# Patient Record
Sex: FEMALE | Race: WHITE | URBAN - METROPOLITAN AREA
[De-identification: names, ages, dates, MRNs, and addresses within clinical notes are randomized per-mention and may not be internally consistent; named-entity substitution may affect disease eponyms.]

---

## 2018-04-11 ENCOUNTER — HOSPITAL ENCOUNTER (OUTPATIENT)
Facility: CLINIC | Age: 48
Setting detail: OBSERVATION
Discharge: HOME | End: 2018-04-12
Attending: EMERGENCY MEDICINE | Admitting: EMERGENCY MEDICINE
Payer: COMMERCIAL

## 2018-04-11 DIAGNOSIS — T78.40XA ALLERGIC REACTION, INITIAL ENCOUNTER: Primary | ICD-10-CM

## 2018-04-11 DIAGNOSIS — T78.2XXA ANAPHYLAXIS, INITIAL ENCOUNTER: ICD-10-CM

## 2018-04-11 LAB
ANION GAP SERPL CALCULATED.3IONS-SCNC: 12 MMOL/L (ref 3–14)
BASOPHILS # BLD AUTO: 0 10E9/L (ref 0–0.2)
BASOPHILS NFR BLD AUTO: 0.3 %
BUN SERPL-MCNC: 17 MG/DL (ref 7–30)
CALCIUM SERPL-MCNC: 8.4 MG/DL (ref 8.5–10.1)
CHLORIDE SERPL-SCNC: 106 MMOL/L (ref 94–109)
CO2 SERPL-SCNC: 23 MMOL/L (ref 20–32)
CREAT SERPL-MCNC: 0.76 MG/DL (ref 0.52–1.04)
DIFFERENTIAL METHOD BLD: NORMAL
EOSINOPHIL # BLD AUTO: 0.2 10E9/L (ref 0–0.7)
EOSINOPHIL NFR BLD AUTO: 2.2 %
ERYTHROCYTE [DISTWIDTH] IN BLOOD BY AUTOMATED COUNT: 12.3 % (ref 10–15)
GFR SERPL CREATININE-BSD FRML MDRD: 82 ML/MIN/1.7M2
GLUCOSE SERPL-MCNC: 120 MG/DL (ref 70–99)
HCT VFR BLD AUTO: 45.9 % (ref 35–47)
HGB BLD-MCNC: 15.5 G/DL (ref 11.7–15.7)
IMM GRANULOCYTES # BLD: 0 10E9/L (ref 0–0.4)
IMM GRANULOCYTES NFR BLD: 0.3 %
LYMPHOCYTES # BLD AUTO: 3.9 10E9/L (ref 0.8–5.3)
LYMPHOCYTES NFR BLD AUTO: 52.3 %
MCH RBC QN AUTO: 32 PG (ref 26.5–33)
MCHC RBC AUTO-ENTMCNC: 33.8 G/DL (ref 31.5–36.5)
MCV RBC AUTO: 95 FL (ref 78–100)
MONOCYTES # BLD AUTO: 0.4 10E9/L (ref 0–1.3)
MONOCYTES NFR BLD AUTO: 4.9 %
NEUTROPHILS # BLD AUTO: 3 10E9/L (ref 1.6–8.3)
NEUTROPHILS NFR BLD AUTO: 40 %
NRBC # BLD AUTO: 0 10*3/UL
NRBC BLD AUTO-RTO: 0 /100
PLATELET # BLD AUTO: 251 10E9/L (ref 150–450)
POTASSIUM SERPL-SCNC: 3.7 MMOL/L (ref 3.4–5.3)
RBC # BLD AUTO: 4.85 10E12/L (ref 3.8–5.2)
SODIUM SERPL-SCNC: 140 MMOL/L (ref 133–144)
T4 FREE SERPL-MCNC: 0.89 NG/DL (ref 0.76–1.46)
TSH SERPL DL<=0.005 MIU/L-ACNC: 4.61 MU/L (ref 0.4–4)
WBC # BLD AUTO: 7.4 10E9/L (ref 4–11)

## 2018-04-11 PROCEDURE — 25000128 H RX IP 250 OP 636: Performed by: NURSE PRACTITIONER

## 2018-04-11 PROCEDURE — 84443 ASSAY THYROID STIM HORMONE: CPT | Performed by: EMERGENCY MEDICINE

## 2018-04-11 PROCEDURE — 96375 TX/PRO/DX INJ NEW DRUG ADDON: CPT

## 2018-04-11 PROCEDURE — 25000128 H RX IP 250 OP 636: Performed by: EMERGENCY MEDICINE

## 2018-04-11 PROCEDURE — 96374 THER/PROPH/DIAG INJ IV PUSH: CPT

## 2018-04-11 PROCEDURE — 96376 TX/PRO/DX INJ SAME DRUG ADON: CPT

## 2018-04-11 PROCEDURE — 84439 ASSAY OF FREE THYROXINE: CPT | Performed by: EMERGENCY MEDICINE

## 2018-04-11 PROCEDURE — 80048 BASIC METABOLIC PNL TOTAL CA: CPT | Performed by: EMERGENCY MEDICINE

## 2018-04-11 PROCEDURE — 99220 ZZC INITIAL OBSERVATION CARE,LEVL III: CPT | Mod: Z6 | Performed by: NURSE PRACTITIONER

## 2018-04-11 PROCEDURE — G0378 HOSPITAL OBSERVATION PER HR: HCPCS

## 2018-04-11 PROCEDURE — 99285 EMERGENCY DEPT VISIT HI MDM: CPT | Mod: 25

## 2018-04-11 PROCEDURE — 85025 COMPLETE CBC W/AUTO DIFF WBC: CPT | Performed by: EMERGENCY MEDICINE

## 2018-04-11 RX ORDER — ONDANSETRON 4 MG/1
4 TABLET, ORALLY DISINTEGRATING ORAL EVERY 6 HOURS PRN
Status: DISCONTINUED | OUTPATIENT
Start: 2018-04-11 | End: 2018-04-11

## 2018-04-11 RX ORDER — DIPHENHYDRAMINE HYDROCHLORIDE 50 MG/ML
25-50 INJECTION INTRAMUSCULAR; INTRAVENOUS EVERY 4 HOURS PRN
Status: DISCONTINUED | OUTPATIENT
Start: 2018-04-11 | End: 2018-04-12 | Stop reason: HOSPADM

## 2018-04-11 RX ORDER — ONDANSETRON 4 MG/1
4 TABLET, ORALLY DISINTEGRATING ORAL EVERY 6 HOURS PRN
Status: DISCONTINUED | OUTPATIENT
Start: 2018-04-11 | End: 2018-04-12 | Stop reason: HOSPADM

## 2018-04-11 RX ORDER — ALBUTEROL SULFATE 0.83 MG/ML
2.5 SOLUTION RESPIRATORY (INHALATION)
Status: DISCONTINUED | OUTPATIENT
Start: 2018-04-11 | End: 2018-04-12 | Stop reason: HOSPADM

## 2018-04-11 RX ORDER — METOCLOPRAMIDE HYDROCHLORIDE 5 MG/ML
10 INJECTION INTRAMUSCULAR; INTRAVENOUS EVERY 6 HOURS PRN
Status: DISCONTINUED | OUTPATIENT
Start: 2018-04-11 | End: 2018-04-12 | Stop reason: HOSPADM

## 2018-04-11 RX ORDER — EPINEPHRINE 0.3 MG/.3ML
0.3 INJECTION SUBCUTANEOUS
Status: DISCONTINUED | OUTPATIENT
Start: 2018-04-11 | End: 2018-04-11

## 2018-04-11 RX ORDER — DIPHENHYDRAMINE HYDROCHLORIDE 50 MG/ML
25 INJECTION INTRAMUSCULAR; INTRAVENOUS ONCE
Status: COMPLETED | OUTPATIENT
Start: 2018-04-11 | End: 2018-04-11

## 2018-04-11 RX ORDER — DIPHENHYDRAMINE HCL 25 MG
25 CAPSULE ORAL
Status: DISCONTINUED | OUTPATIENT
Start: 2018-04-12 | End: 2018-04-12 | Stop reason: HOSPADM

## 2018-04-11 RX ORDER — METOCLOPRAMIDE 5 MG/1
10 TABLET ORAL EVERY 6 HOURS PRN
Status: DISCONTINUED | OUTPATIENT
Start: 2018-04-11 | End: 2018-04-12 | Stop reason: HOSPADM

## 2018-04-11 RX ORDER — LIDOCAINE 40 MG/G
CREAM TOPICAL
Status: DISCONTINUED | OUTPATIENT
Start: 2018-04-11 | End: 2018-04-12 | Stop reason: HOSPADM

## 2018-04-11 RX ORDER — DIPHENHYDRAMINE HYDROCHLORIDE 50 MG/ML
25 INJECTION INTRAMUSCULAR; INTRAVENOUS EVERY 6 HOURS
Status: DISCONTINUED | OUTPATIENT
Start: 2018-04-11 | End: 2018-04-11

## 2018-04-11 RX ORDER — PROCHLORPERAZINE MALEATE 5 MG
10 TABLET ORAL EVERY 6 HOURS PRN
Status: DISCONTINUED | OUTPATIENT
Start: 2018-04-11 | End: 2018-04-12 | Stop reason: HOSPADM

## 2018-04-11 RX ORDER — ACETAMINOPHEN 650 MG/1
650 SUPPOSITORY RECTAL EVERY 4 HOURS PRN
Status: DISCONTINUED | OUTPATIENT
Start: 2018-04-11 | End: 2018-04-12 | Stop reason: HOSPADM

## 2018-04-11 RX ORDER — METHYLPREDNISOLONE SODIUM SUCCINATE 40 MG/ML
40 INJECTION, POWDER, LYOPHILIZED, FOR SOLUTION INTRAMUSCULAR; INTRAVENOUS EVERY 6 HOURS
Status: DISCONTINUED | OUTPATIENT
Start: 2018-04-11 | End: 2018-04-12 | Stop reason: HOSPADM

## 2018-04-11 RX ORDER — EPINEPHRINE 0.3 MG/.3ML
0.3 INJECTION SUBCUTANEOUS
Status: DISCONTINUED | OUTPATIENT
Start: 2018-04-11 | End: 2018-04-12 | Stop reason: HOSPADM

## 2018-04-11 RX ORDER — ONDANSETRON 2 MG/ML
4 INJECTION INTRAMUSCULAR; INTRAVENOUS EVERY 6 HOURS PRN
Status: DISCONTINUED | OUTPATIENT
Start: 2018-04-11 | End: 2018-04-11

## 2018-04-11 RX ORDER — ONDANSETRON 2 MG/ML
4 INJECTION INTRAMUSCULAR; INTRAVENOUS ONCE
Status: COMPLETED | OUTPATIENT
Start: 2018-04-11 | End: 2018-04-11

## 2018-04-11 RX ORDER — ONDANSETRON 2 MG/ML
4 INJECTION INTRAMUSCULAR; INTRAVENOUS EVERY 6 HOURS PRN
Status: DISCONTINUED | OUTPATIENT
Start: 2018-04-11 | End: 2018-04-12 | Stop reason: HOSPADM

## 2018-04-11 RX ORDER — METHYLPREDNISOLONE SODIUM SUCCINATE 125 MG/2ML
125 INJECTION, POWDER, LYOPHILIZED, FOR SOLUTION INTRAMUSCULAR; INTRAVENOUS ONCE
Status: COMPLETED | OUTPATIENT
Start: 2018-04-11 | End: 2018-04-11

## 2018-04-11 RX ORDER — PROCHLORPERAZINE 25 MG
25 SUPPOSITORY, RECTAL RECTAL EVERY 12 HOURS PRN
Status: DISCONTINUED | OUTPATIENT
Start: 2018-04-11 | End: 2018-04-12 | Stop reason: HOSPADM

## 2018-04-11 RX ORDER — ACETAMINOPHEN 325 MG/1
650 TABLET ORAL EVERY 4 HOURS PRN
Status: DISCONTINUED | OUTPATIENT
Start: 2018-04-11 | End: 2018-04-12 | Stop reason: HOSPADM

## 2018-04-11 RX ADMIN — METHYLPREDNISOLONE SODIUM SUCCINATE 40 MG: 40 INJECTION, POWDER, LYOPHILIZED, FOR SOLUTION INTRAMUSCULAR; INTRAVENOUS at 18:32

## 2018-04-11 RX ADMIN — RANITIDINE HYDROCHLORIDE 50 MG: 25 INJECTION INTRAMUSCULAR; INTRAVENOUS at 18:32

## 2018-04-11 RX ADMIN — ONDANSETRON 4 MG: 2 INJECTION INTRAMUSCULAR; INTRAVENOUS at 16:34

## 2018-04-11 RX ADMIN — DIPHENHYDRAMINE HYDROCHLORIDE 25 MG: 50 INJECTION, SOLUTION INTRAMUSCULAR; INTRAVENOUS at 13:21

## 2018-04-11 RX ADMIN — RANITIDINE HYDROCHLORIDE 50 MG: 25 INJECTION INTRAMUSCULAR; INTRAVENOUS at 13:22

## 2018-04-11 RX ADMIN — METHYLPREDNISOLONE 125 MG: 125 INJECTION, POWDER, LYOPHILIZED, FOR SOLUTION INTRAMUSCULAR; INTRAVENOUS at 13:22

## 2018-04-11 RX ADMIN — DIPHENHYDRAMINE HYDROCHLORIDE 25 MG: 50 INJECTION, SOLUTION INTRAMUSCULAR; INTRAVENOUS at 18:59

## 2018-04-11 ASSESSMENT — ACTIVITIES OF DAILY LIVING (ADL)
RETIRED_EATING: 0-->INDEPENDENT
TRANSFERRING: 0-->INDEPENDENT
FALL_HISTORY_WITHIN_LAST_SIX_MONTHS: YES
TOILETING: 0-->INDEPENDENT
SWALLOWING: 0-->SWALLOWS FOODS/LIQUIDS WITHOUT DIFFICULTY
RETIRED_COMMUNICATION: 0-->UNDERSTANDS/COMMUNICATES WITHOUT DIFFICULTY
BATHING: 0-->INDEPENDENT
COGNITION: 0 - NO COGNITION ISSUES REPORTED
DRESS: 0-->INDEPENDENT
NUMBER_OF_TIMES_PATIENT_HAS_FALLEN_WITHIN_LAST_SIX_MONTHS: 1
AMBULATION: 0-->INDEPENDENT

## 2018-04-11 ASSESSMENT — ENCOUNTER SYMPTOMS: FACIAL SWELLING: 1

## 2018-04-11 NOTE — H&P
[facility] ED Observation Admission Note    Chief Complaint   Patient presents with     Allergic Reaction       Assessment/Plan:  Rosa Elena Chandler is a 47 year old female with a history of nodular goiter who presented to the ED with an allergic reaction    1. Allergic reaction related to chlorhexidine prep along with an alcohol spray used in Endocrine clinic in preparation for a thyroid biopsy. Patient reports sweeling of the lips, tongue and diffuse urticaria on the anterior chest and difficult breathing. Patient received Epi in the clinic. No known allergies per patient. Received Zantac 50 mg IV, Methylprednisone 125 mg IV, Benadryl 25 mg IV, 1 Liter IV fluid bolus. WBC 7.4. Temp 97.7, Hr78, Respirations 16 /67. SpO2 100 %  - frequent rechecks  - Continuous pulse ox and telemetry  - Zantac 50mg IV q8h  - Benadryl 25 IV q6h   - Solumedrol 40mg IV q6h and transition to oral steroids in the morning  - Epi prn difficulty breathing  - albuterol neb prn  - Encourage increased oral fluid intake. Otherwise will need to start IVF  - antiemetics prn  --CBC.BMP in am  - Will need Epi pen for discharge    2. Nodular goiter - Patient being followed by Endocrinology at [facility]. Thyroid biopsy completed. TSH today: 4.61 T4 0.89   - Continue outpatient follow up      HPI:    Rosa Elena Chandler is a 47 year old female with a history of nodular goiter who presented to the ED with an allergic reaction. Patient was undergoing a biopsy of her thyroid. Chlorhexidine prep was used as well as alcohol spray to numb the anterior neck. The patient noted swelling of her lips, tongue face and diffuse urticaria on her chest.  She received Epi and IV benadryl and 911 was called. She denies any previous allergic reactions. Reports feeling more itchy lately. Denies any recent changes in lotions, body washes, laundry detergents, new clothes, moving houses, new exposure to molds, new exposure to foods. Notes bronchitis  associated with yearly weather changes.     In the ED   Vitals:Temp 97.7, Hr78, Respirations 16 /67. SpO2 100 % Labs: sodium 140, potassium 3.7, Cr 0.76, BUN 17, GFR 82, TSH 4.61, T4 0.89, , WC 7.4, hgb 15.5, Plt 15.5. Medications: Received Zantac 50 mg IV, Methylprednisone 125 mg IV, Benadryl 25 mg IV, ! Liter IV fluid bolus.Imaging: none Consults: none Plan: Admit to ED observation to monitor for improvement or resolution of allergic reaction.     On admission to the observation unit the patient was stable. Reported swelling had gone down on her face, raspy voice, few macular papular spots on anterior chest. Reports nausea.    History:    History reviewed. No pertinent past medical history.    No past surgical history on file.    No family history on file.    Social History     Social History     Marital status:      Spouse name: N/A     Number of children: N/A     Years of education: N/A     Occupational History     Not on file.     Social History Main Topics     Smoking status: Not on file     Smokeless tobacco: Not on file     Alcohol use Not on file     Drug use: Not on file     Sexual activity: Not on file     Other Topics Concern     Not on file     Social History Narrative     No narrative on file         No current facility-administered medications on file prior to encounter.   No current outpatient prescriptions on file prior to encounter.    Data:    Results for orders placed or performed during the hospital encounter of 04/11/18   CBC with platelets differential   Result Value Ref Range    WBC 7.4 4.0 - 11.0 10e9/L    RBC Count 4.85 3.8 - 5.2 10e12/L    Hemoglobin 15.5 11.7 - 15.7 g/dL    Hematocrit 45.9 35.0 - 47.0 %    MCV 95 78 - 100 fl    MCH 32.0 26.5 - 33.0 pg    MCHC 33.8 31.5 - 36.5 g/dL    RDW 12.3 10.0 - 15.0 %    Platelet Count 251 150 - 450 10e9/L    Diff Method Automated Method     % Neutrophils 40.0 %    % Lymphocytes 52.3 %    % Monocytes 4.9 %    % Eosinophils 2.2 %     % Basophils 0.3 %    % Immature Granulocytes 0.3 %    Nucleated RBCs 0 0 /100    Absolute Neutrophil 3.0 1.6 - 8.3 10e9/L    Absolute Lymphocytes 3.9 0.8 - 5.3 10e9/L    Absolute Monocytes 0.4 0.0 - 1.3 10e9/L    Absolute Eosinophils 0.2 0.0 - 0.7 10e9/L    Absolute Basophils 0.0 0.0 - 0.2 10e9/L    Abs Immature Granulocytes 0.0 0 - 0.4 10e9/L    Absolute Nucleated RBC 0.0    Basic metabolic panel   Result Value Ref Range    Sodium 140 133 - 144 mmol/L    Potassium 3.7 3.4 - 5.3 mmol/L    Chloride 106 94 - 109 mmol/L    Carbon Dioxide 23 20 - 32 mmol/L    Anion Gap 12 3 - 14 mmol/L    Glucose 120 (H) 70 - 99 mg/dL    Urea Nitrogen 17 7 - 30 mg/dL    Creatinine 0.76 0.52 - 1.04 mg/dL    GFR Estimate 82 >60 mL/min/1.7m2    GFR Estimate If Black >90 >60 mL/min/1.7m2    Calcium 8.4 (L) 8.5 - 10.1 mg/dL   TSH with free T4 reflex   Result Value Ref Range    TSH 4.61 (H) 0.40 - 4.00 mU/L   T4 free   Result Value Ref Range    T4 Free 0.89 0.76 - 1.46 ng/dL        ROS:    Review Of Systems  Skin: rash, itching  Eyes: negative  Ears/Nose/Throat: raspy, tongue and lip swelling   Respiratory: Shortness of breath  Cardiovascular: negative  Gastrointestinal: nausea  Genitourinary: negative  Musculoskeletal: negative  Neurologic: negative  Psychiatric: negative  Hematologic/Lymphatic/Immunologic: negative  Endocrine: goiter      10 point ROS negative other than the symptoms noted above.      Exam:    Vitals:  B/P: 119/68, T: 97.7, P: 78, R: 14    Constitutional: healthy, alert and no distress   Head: Normocephalic. No masses, lesions, tenderness or abnormalities   Neck: Neck supple. No adenopathy. Thyroid symmetric, normal size,, Carotids without bruits.   ENT: ENT exam normal, no neck nodes or sinus tenderness   Cardiovascular: RRR. No murmurs, clicks gallops or rub   Respiratory: . Good diaphragmatic excursion. Lungs clear   Gastrointestinal: Abdomen soft,. BS normal. No masses, organomegaly.   : Deferred   Musculoskeletal:  extremities normal- no gross deformities noted, gait normal and normal muscle tone   Skin: no suspicious lesions or rashes   Neurologic: Gait normal. Reflexes normal and symmetric. Sensation grossly WNL.   Psychiatric: mentation appears normal and affect normal/bright   Hematologic/Lymphatic/Immunologic: normal ant/post cervical, axillary, supraclavicular and inguinal     Consults: none  FEN: Regular diet   DVT prophylaxis: Early ambulation  Disposition: Home tomorrow if sitable vital signs, resolution or improvement of allergic reactions and no return of concerning symptoms after 12 hours of observation.     Signed:  NATHANIEL Pena, NP  Emergency Department  [phone] Ex [id number]  April 11, 2018 at 3:06 PM

## 2018-04-11 NOTE — PHARMACY-ADMISSION MEDICATION HISTORY
Admission medication history interview status for the 4/11/2018 admission is complete. See Epic admission navigator for allergy information, pharmacy, prior to admission medications and immunization status.     Medication history interview sources:  Patient    Changes made to PTA medication list (reason)  Added: None  Deleted: None  Changed: None    Additional medication history information (including reliability of information, actions taken by pharmacist):   1. Patient was a reliable historian. Denies taking any prescription, OTC, or supplements.   2. Patient confirmed NKDA.   3. Patient has not received the influenza vaccine for the current flu season.      Prior to Admission medications    Not on File       Medication history completed by: Moriah Major, PharmD

## 2018-04-11 NOTE — ED NOTES
Bed: ED02  Expected date: 4/11/18  Expected time: 12:52 PM  Means of arrival: Ambulance  Comments:  [id number]  47 female  Allergic rxn  Epi pen given  Hives on chest.

## 2018-04-11 NOTE — ED NOTES
Pt BIBA from clinic for allergic reaction while getting biopsy. Pt reports throat irritation, lip swelling, SOB, hives to chest, light headed and nausea.

## 2018-04-11 NOTE — IP AVS SNAPSHOT
Unit 6D Observation [address] [address]    Phone:  [phone]    Fax:  [phone]                                       After Visit Summary   4/11/2018    Rosa Elena Chandler    MRN: [id number]           After Visit Summary Signature Page     I have received my discharge instructions, and my questions have been answered. I have discussed any challenges I see with this plan with the nurse or doctor.    ..........................................................................................................................................  Patient/Patient Representative Signature      ..........................................................................................................................................  Patient Representative Print Name and Relationship to Patient    ..................................................               ................................................  Date                                            Time    ..........................................................................................................................................  Reviewed by Signature/Title    ...................................................              ..............................................  Date                                                            Time

## 2018-04-11 NOTE — IP AVS SNAPSHOT
MRN:[id number]                      After Visit Summary   4/11/2018    Rosa Elena Chandler    MRN: [id number]           Thank you!     Thank you for choosing [facility] for your care. Our goal is always to provide you with excellent care. Hearing back from our patients is one way we can continue to improve our services. Please take a few minutes to complete the written survey that you may receive in the mail after you visit with us. Thank you!        Patient Information     Date Of Birth          1970        Designated Caregiver       Most Recent Value    Caregiver    Will someone help with your care after discharge? yes    Name of designated caregiver Yinka    Phone number of caregiver [phone]    Caregiver address same as patient      About your hospital stay     You were admitted on:  April 11, 2018 You last received care in the:  Unit 6D Observation [facility]    You were discharged on:  April 12, 2018        Reason for your hospital stay       Allergic reaction                  Who to Call     For medical emergencies, please call 911.  For non-urgent questions about your medical care, please call your primary care provider or clinic, None          Attending Provider     Provider Specialty    Jere Goins MD Emergency Medicine    Farzad Espino MD Internal Medicine       Primary Care Provider Fax #    Physician No Ref-Primary [phone]       When to contact your care team       Return to the ED if you develop uncontrolled nausea, vomiting, rash (hives), swelling of your tongue or throat, dizziness, chest pain, shortness of breath, loss of consciousness, and any new or concerning symptoms.                  After Care Instructions     Activity       Your activity upon discharge: activity as tolerated            Diet       Follow this diet upon discharge: Regular            Discharge Instructions       You have been diagnosed with a serious allergic reaction  "known as  anaphylaxis. Anaphylaxis happens within minutes of exposure to an allergen. Common causes are penicillin, nuts, IV (intravenous) contrast dyes used for some X-rays and scans, a bee sting, or latex products. You were treated with Zantac, Benadryl, and steroids which resolved your symptoms. You will be discharged with Epi pen, medication to have with you at all times and use for any similar symptoms. Carry one kit with you and place the other and easy accessible place. Follow up with your provider in 1 week of your hospital discharge.                  Follow-up Appointments     Follow Up and recommended labs and tests       Follow up with primary care provider in 1 week                  Pending Results     No orders found for last 3 day(s).            Statement of Approval     Ordered          04/12/18 1058  I have reviewed and agree with all the recommendations and orders detailed in this document.  EFFECTIVE NOW     Approved and electronically signed by:  Huong Parker PA-C             Admission Information     Date & Time Provider Department Dept. Phone    4/11/2018 Farzad Espino MD Unit 6D Observation [facility] [place] [phone]      Your Vitals Were     Blood Pressure Pulse Temperature Respirations Height Pulse Oximetry    115/64 (BP Location: Left arm) 78 97.9  F (36.6  C) (Oral) 16 1.702 m (5' 7\") 97%      MyChart Information     [facility] lets you send messages to your doctor, view your test results, renew your prescriptions, schedule appointments and more. To sign up, go to [email or web address] . Click on \"Log in\" on the left side of the screen, which will take you to the Welcome page. Then click on \"Sign up Now\" on the right side of the page.     You will be asked to enter the access code listed below, as well as some personal information. Please follow the directions to create your username and password.     Your access code is: [email or web address]  Expires: 7/10/2018  4:15 PM     Your " access code will  in 90 days. If you need help or a new code, please call your [facility] clinic or [phone].        Care EveryWhere ID     This is your Care EveryWhere ID. This could be used by other organizations to access your [facility] medical records  [id number]        Equal Access to Services     ONEALJESSICA ENRIKE : Hadii aad ku hadasho Soomaali, waaxda luqadaha, qaybta kaalmada adeegyada, waxbennie isatu alln adeelian jose huma taylor. So [facility] [phone].    ATENCIÓN: Si habla español, tiene a mitchell disposición servicios gratuitos de asistencia lingüística. Valery al [phone].    We comply with applicable federal civil rights laws and [place] laws. We do not discriminate on the basis of race, color, national origin, age, disability, sex, sexual orientation, or gender identity.               Review of your medicines      START taking        Dose / Directions    diphenhydrAMINE 25 MG capsule   Commonly known as:  BENADRYL        Dose:  25 mg   Take 1 capsule (25 mg) by mouth every 6 hours as needed for itching or allergies   Quantity:  56 capsule   Refills:  0       EPINEPHrine 0.3 MG/0.3ML injection 2-pack   Commonly known as:  EPIPEN 2-HANH   Used for:  Anaphylaxis, initial encounter        Dose:  0.3 mg   Inject 0.3 mLs (0.3 mg) into the muscle as needed for anaphylaxis Inject 0.3 ml into the muscle x 1, may repeat dose x 1 after 5-15 mintutes   Quantity:  0.6 mL   Refills:  0            Where to get your medicines      Some of these will need a paper prescription and others can be bought over the counter. Ask your nurse if you have questions.     Bring a paper prescription for each of these medications     diphenhydrAMINE 25 MG capsule    EPINEPHrine 0.3 MG/0.3ML injection 2-pack                Protect others around you: Learn how to safely use, store and throw away your medicines at www.disposemymeds.org.             Medication List: This is a list of all your medications and when to take them. Check  marks below indicate your daily home schedule. Keep this list as a reference.      Medications           Morning Afternoon Evening Bedtime As Needed    diphenhydrAMINE 25 MG capsule   Commonly known as:  BENADRYL   Take 1 capsule (25 mg) by mouth every 6 hours as needed for itching or allergies   Last time this was given:  25 mg on 4/12/2018  9:17 AM                                EPINEPHrine 0.3 MG/0.3ML injection 2-pack   Commonly known as:  EPIPEN 2-HANH   Inject 0.3 mLs (0.3 mg) into the muscle as needed for anaphylaxis Inject 0.3 ml into the muscle x 1, may repeat dose x 1 after 5-15 mintutes

## 2018-04-11 NOTE — ED PROVIDER NOTES
"  History     Chief Complaint   Patient presents with     Allergic Reaction     HPI  Rosa Elena Chandler is a 47 year old female who comes from the endocrinology clinic at [facility] after an attempted thyroid biopsy.  The patient has been diagnosed recently with a goiter and has had no previous allergic reactions.  The patient was in the clinic and underwent a chlorhexidine prep along with an alcohol spray to numb the skin on her anterior neck.  In the process of receiving those 2 things the patient started to have swelling of the lips along with diffuse urticaria and difficulty breathing.  The patient subsequently was given epi and 911 was called.  The patient was sent to the ER for evaluation with moderate improvement of her symptoms after epi was given.  Patient states she has never had an allergic reaction before and is not allergic to anything that she knows of.      This part of the document was transcribed by [facility].   I have reviewed the Medications, Allergies, Past Medical and Surgical History, and Social History in the [facility] system.  History reviewed. No pertinent past medical history.    No past surgical history on file.    No family history on file.    Social History   Substance Use Topics     Smoking status: Not on file     Smokeless tobacco: Not on file     Alcohol use Not on file     Previous Medications    No medications on file      No Known Allergies    Review of Systems   HENT: Positive for facial swelling (lips).    Skin: Positive for rash.   All other systems reviewed and are negative.      Physical Exam   BP: 126/67  Pulse: 78  Heart Rate: 82  Temp: 97.7  F (36.5  C)  Resp: 16  Height: 170.2 cm (5' 7\")  SpO2: 100 %      Physical Exam   Constitutional: She is oriented to person, place, and time.   Alert conversant and slightly anxious   HENT:   Head: Atraumatic.   No oropharyngeal swelling   Eyes: EOM are normal. Pupils are equal, round, and reactive to " light.   Neck: Neck supple.   Cardiovascular: Normal heart sounds.    Pulmonary/Chest: Breath sounds normal. No stridor. She has no wheezes.   Abdominal: Soft. There is no tenderness.   Musculoskeletal: She exhibits no edema or tenderness.   Neurological: She is alert and oriented to person, place, and time.   Grossly intact and symmetric   Skin: No rash noted.   Psychiatric: She has a normal mood and affect.       ED Course     ED Course     Procedures          Patient was placed on cardiac monitor and oximetry.    IV established for blood draw and fluid administration.    Medications   sodium chloride (PF) 0.9% PF flush 3 mL (not administered)   sodium chloride (PF) 0.9% PF flush 3 mL (not administered)   0.9% sodium chloride BOLUS (not administered)   ranitidine (ZANTAC) injection 50 mg (50 mg Intravenous Given 4/11/18 1322)   methylPREDNISolone sodium succinate (solu-MEDROL) injection 125 mg (125 mg Intravenous Given 4/11/18 1322)   diphenhydrAMINE (BENADRYL) injection 25 mg (25 mg Intravenous Given 4/11/18 1321)       Results for orders placed or performed during the hospital encounter of 04/11/18   CBC with platelets differential   Result Value Ref Range    WBC 7.4 4.0 - 11.0 10e9/L    RBC Count 4.85 3.8 - 5.2 10e12/L    Hemoglobin 15.5 11.7 - 15.7 g/dL    Hematocrit 45.9 35.0 - 47.0 %    MCV 95 78 - 100 fl    MCH 32.0 26.5 - 33.0 pg    MCHC 33.8 31.5 - 36.5 g/dL    RDW 12.3 10.0 - 15.0 %    Platelet Count 251 150 - 450 10e9/L    Diff Method Automated Method     % Neutrophils 40.0 %    % Lymphocytes 52.3 %    % Monocytes 4.9 %    % Eosinophils 2.2 %    % Basophils 0.3 %    % Immature Granulocytes 0.3 %    Nucleated RBCs 0 0 /100    Absolute Neutrophil 3.0 1.6 - 8.3 10e9/L    Absolute Lymphocytes 3.9 0.8 - 5.3 10e9/L    Absolute Monocytes 0.4 0.0 - 1.3 10e9/L    Absolute Eosinophils 0.2 0.0 - 0.7 10e9/L    Absolute Basophils 0.0 0.0 - 0.2 10e9/L    Abs Immature Granulocytes 0.0 0 - 0.4 10e9/L    Absolute  Nucleated RBC 0.0    Basic metabolic panel   Result Value Ref Range    Sodium 140 133 - 144 mmol/L    Potassium 3.7 3.4 - 5.3 mmol/L    Chloride 106 94 - 109 mmol/L    Carbon Dioxide 23 20 - 32 mmol/L    Anion Gap 12 3 - 14 mmol/L    Glucose 120 (H) 70 - 99 mg/dL    Urea Nitrogen 17 7 - 30 mg/dL    Creatinine 0.76 0.52 - 1.04 mg/dL    GFR Estimate 82 >60 mL/min/1.7m2    GFR Estimate If Black >90 >60 mL/min/1.7m2    Calcium 8.4 (L) 8.5 - 10.1 mg/dL   TSH with free T4 reflex   Result Value Ref Range    TSH 4.61 (H) 0.40 - 4.00 mU/L   T4 free   Result Value Ref Range    T4 Free 0.89 0.76 - 1.46 ng/dL         Labs Ordered and Resulted from Time of ED Arrival Up to the Time of Departure from the ED   BASIC METABOLIC PANEL - Abnormal; Notable for the following:        Result Value    Glucose 120 (*)     Calcium 8.4 (*)     All other components within normal limits   TSH WITH FREE T4 REFLEX - Abnormal; Notable for the following:     TSH 4.61 (*)     All other components within normal limits   CBC WITH PLATELETS DIFFERENTIAL   T4 FREE   PULSE OXIMETRY NURSING   CARDIAC CONTINUOUS MONITORING   PERIPHERAL IV CATHETER            Assessments & Plan (with Medical Decision Making)     I have reviewed the nursing notes.    Patient has remained comfortable in the ER and hemodynamically stable.  There is been no recurrence of her symptoms however because of the severity of her symptoms and her incident, the patient will be transferred upstairs to the OBS unit under the appropriate protocol.    I have reviewed the findings, diagnosis, and plan with the patient.    Final diagnoses:   Anaphylaxis, initial encounter     Jere Goins MD    4/11/2018   [facility], EMERGENCY DEPARTMENT     Jere Goins MD  04/11/18 8497

## 2018-04-12 VITALS
HEIGHT: 67 IN | SYSTOLIC BLOOD PRESSURE: 115 MMHG | HEART RATE: 78 BPM | DIASTOLIC BLOOD PRESSURE: 64 MMHG | TEMPERATURE: 97.9 F | OXYGEN SATURATION: 97 % | RESPIRATION RATE: 16 BRPM

## 2018-04-12 LAB
ANION GAP SERPL CALCULATED.3IONS-SCNC: 11 MMOL/L (ref 3–14)
BUN SERPL-MCNC: 18 MG/DL (ref 7–30)
CALCIUM SERPL-MCNC: 8.8 MG/DL (ref 8.5–10.1)
CHLORIDE SERPL-SCNC: 108 MMOL/L (ref 94–109)
CO2 SERPL-SCNC: 22 MMOL/L (ref 20–32)
CREAT SERPL-MCNC: 0.73 MG/DL (ref 0.52–1.04)
ERYTHROCYTE [DISTWIDTH] IN BLOOD BY AUTOMATED COUNT: 12.5 % (ref 10–15)
GFR SERPL CREATININE-BSD FRML MDRD: 86 ML/MIN/1.7M2
GLUCOSE SERPL-MCNC: 157 MG/DL (ref 70–99)
HCT VFR BLD AUTO: 44.1 % (ref 35–47)
HGB BLD-MCNC: 14.8 G/DL (ref 11.7–15.7)
MCH RBC QN AUTO: 32 PG (ref 26.5–33)
MCHC RBC AUTO-ENTMCNC: 33.6 G/DL (ref 31.5–36.5)
MCV RBC AUTO: 96 FL (ref 78–100)
PLATELET # BLD AUTO: 221 10E9/L (ref 150–450)
POTASSIUM SERPL-SCNC: 4.1 MMOL/L (ref 3.4–5.3)
RBC # BLD AUTO: 4.62 10E12/L (ref 3.8–5.2)
SODIUM SERPL-SCNC: 141 MMOL/L (ref 133–144)
WBC # BLD AUTO: 10 10E9/L (ref 4–11)

## 2018-04-12 PROCEDURE — 99217 ZZC OBSERVATION CARE DISCHARGE: CPT | Mod: Z6 | Performed by: EMERGENCY MEDICINE

## 2018-04-12 PROCEDURE — 25000132 ZZH RX MED GY IP 250 OP 250 PS 637: Performed by: NURSE PRACTITIONER

## 2018-04-12 PROCEDURE — G0378 HOSPITAL OBSERVATION PER HR: HCPCS

## 2018-04-12 PROCEDURE — 80048 BASIC METABOLIC PNL TOTAL CA: CPT | Performed by: NURSE PRACTITIONER

## 2018-04-12 PROCEDURE — 25000128 H RX IP 250 OP 636: Performed by: NURSE PRACTITIONER

## 2018-04-12 PROCEDURE — 85027 COMPLETE CBC AUTOMATED: CPT | Performed by: NURSE PRACTITIONER

## 2018-04-12 PROCEDURE — 96376 TX/PRO/DX INJ SAME DRUG ADON: CPT

## 2018-04-12 PROCEDURE — 36415 COLL VENOUS BLD VENIPUNCTURE: CPT | Performed by: NURSE PRACTITIONER

## 2018-04-12 RX ORDER — DIPHENHYDRAMINE HCL 25 MG
25 CAPSULE ORAL EVERY 6 HOURS PRN
Qty: 56 CAPSULE | Refills: 0 | Status: SHIPPED | OUTPATIENT
Start: 2018-04-12

## 2018-04-12 RX ORDER — EPINEPHRINE 0.3 MG/.3ML
0.3 INJECTION SUBCUTANEOUS PRN
Qty: 0.6 ML | Refills: 0 | Status: SHIPPED | OUTPATIENT
Start: 2018-04-12

## 2018-04-12 RX ADMIN — METHYLPREDNISOLONE SODIUM SUCCINATE 40 MG: 40 INJECTION, POWDER, LYOPHILIZED, FOR SOLUTION INTRAMUSCULAR; INTRAVENOUS at 09:19

## 2018-04-12 RX ADMIN — DIPHENHYDRAMINE HYDROCHLORIDE 25 MG: 25 CAPSULE ORAL at 09:17

## 2018-04-12 RX ADMIN — RANITIDINE HYDROCHLORIDE 50 MG: 25 INJECTION INTRAMUSCULAR; INTRAVENOUS at 09:17

## 2018-04-12 RX ADMIN — DIPHENHYDRAMINE HYDROCHLORIDE 25 MG: 25 CAPSULE ORAL at 01:16

## 2018-04-12 RX ADMIN — METHYLPREDNISOLONE SODIUM SUCCINATE 40 MG: 40 INJECTION, POWDER, LYOPHILIZED, FOR SOLUTION INTRAMUSCULAR; INTRAVENOUS at 01:17

## 2018-04-12 RX ADMIN — RANITIDINE HYDROCHLORIDE 50 MG: 25 INJECTION INTRAMUSCULAR; INTRAVENOUS at 01:19

## 2018-04-12 NOTE — PLAN OF CARE
Problem: Patient Care Overview  Goal: Plan of Care/Patient Progress Review  List all goals to be met before discharge home:   - No return of symptoms after 12 hours of observation: YES, symptoms have not returned.  - No oropharyngeal edema or bronchospasm: YES, no edema or bronchospasm   - Vital signs normal or at patient baseline: YES  - Tolerating oral intake to maintain hydration: YES  - Dyspnea improved if present and oxygen saturations greater than 88% on room air or prior home oxygen levels: YES, pt is not dyspneic  - Safe disposition plan has been identified: PENDING

## 2018-04-12 NOTE — DISCHARGE SUMMARY
Discharge Summary    Rosa Elena Chandler MRN# [id number]   YOB: 1970 Age: 47 year old     Date of Admission:  4/11/2018  Date of Discharge:  4/12/2018  Admitting Physician:  Jere Goins MD  Discharge Physician:  YOVANNY ROGERS  Discharging Service:  Emergency Department Observation Unit     Primary Provider: No Ref-Primary, Physician          Discharge Diagnosis:   Allergic reaction             Discharge Disposition:   Discharged to home           Condition on Discharge:   Discharge condition: Stable   Code status on discharge:            Procedures:   No procedures performed during this admission          Discharge Medications:   There are no discharge medications for this patient.            Consultations:   No consultations were requested during this admission             Brief History of Illness:   Rosa Elena Chandler is a 47 year old female with a history of nodular goiter who presented to the ED with an allergic reaction. Patient was undergoing a biopsy of her thyroid. Chlorhexidine prep was used as well as alcohol spray to numb the anterior neck. The patient noted swelling of her lips, tongue face and diffuse urticaria on her chest.  She received Epi and IV benadryl and 911 was called. She denies any previous allergic reactions. Reports feeling more itchy lately. Denies any recent changes in lotions, body washes, laundry detergents, new clothes, moving houses, new exposure to molds, new exposure to foods. Notes bronchitis associated with yearly weather changes.           Hospital Course:   1. Allergic reaction: This is a patient who was admitted for allergic reaction most likely related to chlorhexidine prep along with an alcohol spray used in Endocrine clinic in preparation for a thyroid biopsy. In the ED, VS were stable. Patient received Zantac 50 mg IV, Methylprednisone 125 mg IV, Benadryl 25 mg IV, 1 Liter IV fluid bolus in the ED with good improvement of  "symptoms. Patient was then admitted for further monitoring. No interval events overnight. She is feeling much better this morning. Denies difficulty with breathing or swallowing. Denies, chest pain, dyspnea, pruritus or rash. She is able to advance diet without issues. Patient will be discharged to home this morning with Epi pen. Continue with other home medications as before. Follow up with PCP in 1 week post discharge. She is to return to the ED if symptoms reappear.     2. Nodular goiter - Patient being followed by Endocrinology at [facility]. Thyroid biopsy completed. TSH today: 4.61 T4 0.89   - Continue outpatient follow up            Final Day of Progress before Discharge:       Physical Exam:  Blood pressure 115/64, pulse 78, temperature 97.9  F (36.6  C), temperature source Oral, resp. rate 16, height 1.702 m (5' 7\"), SpO2 97 %.    EXAM:  Physical Exam   Constitutional: Pt is oriented to person, place, and time.Pt appears well-developed and well-nourished.   HENT: Oral mucosa normal. Tongue normal. Posterior pharynx without erythema or swelling.    Head: Normocephalic and atraumatic.   Eyes: Conjunctivae are normal. Pupils are equal, round, and reactive to light.   Neck: Normal range of motion. Neck supple.   Cardiovascular: Normal rate, regular rhythm, normal heart sounds and intact distal pulses.    Pulmonary/Chest: Effort normal and breath sounds normal. No respiratory distress. Pt has no wheezes. Pt has no rales  Abdominal: Soft. Bowel sounds are normal. Pt exhibits no distension and no mass. No tenderness. Pt has no rebound and no guarding.   Musculoskeletal: Normal range of motion. Pt exhibits no edema.   Neurological: Pt is alert and oriented to person, place, and time. Normal reflexes.   Skin: Skin is warm and dry. No rash noted.   Psychiatric: Pt has a normal mood and affect. Behavior is normal. Judgment and thought content normal.             Data:  All laboratory data reviewed             " Significant Results:   None  Results for orders placed or performed during the hospital encounter of 04/11/18   CBC with platelets differential   Result Value Ref Range    WBC 7.4 4.0 - 11.0 10e9/L    RBC Count 4.85 3.8 - 5.2 10e12/L    Hemoglobin 15.5 11.7 - 15.7 g/dL    Hematocrit 45.9 35.0 - 47.0 %    MCV 95 78 - 100 fl    MCH 32.0 26.5 - 33.0 pg    MCHC 33.8 31.5 - 36.5 g/dL    RDW 12.3 10.0 - 15.0 %    Platelet Count 251 150 - 450 10e9/L    Diff Method Automated Method     % Neutrophils 40.0 %    % Lymphocytes 52.3 %    % Monocytes 4.9 %    % Eosinophils 2.2 %    % Basophils 0.3 %    % Immature Granulocytes 0.3 %    Nucleated RBCs 0 0 /100    Absolute Neutrophil 3.0 1.6 - 8.3 10e9/L    Absolute Lymphocytes 3.9 0.8 - 5.3 10e9/L    Absolute Monocytes 0.4 0.0 - 1.3 10e9/L    Absolute Eosinophils 0.2 0.0 - 0.7 10e9/L    Absolute Basophils 0.0 0.0 - 0.2 10e9/L    Abs Immature Granulocytes 0.0 0 - 0.4 10e9/L    Absolute Nucleated RBC 0.0    Basic metabolic panel   Result Value Ref Range    Sodium 140 133 - 144 mmol/L    Potassium 3.7 3.4 - 5.3 mmol/L    Chloride 106 94 - 109 mmol/L    Carbon Dioxide 23 20 - 32 mmol/L    Anion Gap 12 3 - 14 mmol/L    Glucose 120 (H) 70 - 99 mg/dL    Urea Nitrogen 17 7 - 30 mg/dL    Creatinine 0.76 0.52 - 1.04 mg/dL    GFR Estimate 82 >60 mL/min/1.7m2    GFR Estimate If Black >90 >60 mL/min/1.7m2    Calcium 8.4 (L) 8.5 - 10.1 mg/dL   TSH with free T4 reflex   Result Value Ref Range    TSH 4.61 (H) 0.40 - 4.00 mU/L   T4 free   Result Value Ref Range    T4 Free 0.89 0.76 - 1.46 ng/dL   CBC with platelets   Result Value Ref Range    WBC 10.0 4.0 - 11.0 10e9/L    RBC Count 4.62 3.8 - 5.2 10e12/L    Hemoglobin 14.8 11.7 - 15.7 g/dL    Hematocrit 44.1 35.0 - 47.0 %    MCV 96 78 - 100 fl    MCH 32.0 26.5 - 33.0 pg    MCHC 33.6 31.5 - 36.5 g/dL    RDW 12.5 10.0 - 15.0 %    Platelet Count 221 150 - 450 10e9/L   Basic metabolic panel   Result Value Ref Range    Sodium 141 133 - 144 mmol/L     Potassium 4.1 3.4 - 5.3 mmol/L    Chloride 108 94 - 109 mmol/L    Carbon Dioxide 22 20 - 32 mmol/L    Anion Gap 11 3 - 14 mmol/L    Glucose 157 (H) 70 - 99 mg/dL    Urea Nitrogen 18 7 - 30 mg/dL    Creatinine 0.73 0.52 - 1.04 mg/dL    GFR Estimate 86 >60 mL/min/1.7m2    GFR Estimate If Black >90 >60 mL/min/1.7m2    Calcium 8.8 8.5 - 10.1 mg/dL      No results found for this or any previous visit (from the past 48 hour(s)).             Pending Results:   Unresulted Labs Ordered in the Past 30 Days of this Admission     No orders found for last 61 day(s).                  Discharge Instructions and Follow-Up:   No discharge procedures on file.       Attestation:  Huong Parker PA-C

## 2018-04-12 NOTE — PLAN OF CARE
Problem: Patient Care Overview  Goal: Plan of Care/Patient Progress Review  Outpatient/Observation goals to be met before discharge home:    List all goals to be met before discharge home:     - No return of symptoms after 12 hours of observation - PENDING  - No oropharyngeal edema or bronchospasm - NO  - Vital signs normal or at patient baseline - YES  - Tolerating oral intake to maintain hydration - YES  - Dyspnea improved if present and oxygen saturations greater than 88% on room air or prior home oxygen levels - YES  - Safe disposition plan has been identified - NO    Nurse to notify provider when observation goals have been met and patient is ready for discharge.

## 2018-04-12 NOTE — DISCHARGE SUMMARY
Discharge instructions reviewed, understood, and signed by patient. VSS, PIV removed, new medications reviewed and understood, patient has all belongings. Patient left unit under own power.

## 2018-04-12 NOTE — PROGRESS NOTES
S/O: Pt feeling much better, no further itching or rash. She denies any difficulty breathing or swallowing, swelling in her tongue or throat. Her neck is sore from the biopsies.     Awake and alert. No acute distress.  Oral pharanyx without erythema or edema. Neck with swelling and tenderness around the biopsy site. No uticaria noted on neck or chest any longer.   Non-labored respirations on ambient air.    A/P: Continue scheduled medications. Change benadryl to PO given IV burns to badly thru IV.  Likely discharge in AM if remains stable/improved.     Balbina Castillo CNP  Emergency Department Observation Unit [facility]  Pager: [phone]  Phone: [phone]